# Patient Record
Sex: MALE | Race: ASIAN
[De-identification: names, ages, dates, MRNs, and addresses within clinical notes are randomized per-mention and may not be internally consistent; named-entity substitution may affect disease eponyms.]

---

## 2023-07-09 ENCOUNTER — HOSPITAL ENCOUNTER (EMERGENCY)
Dept: HOSPITAL 76 - ED | Age: 68
Discharge: HOME | End: 2023-07-09
Payer: MEDICARE

## 2023-07-09 VITALS — SYSTOLIC BLOOD PRESSURE: 129 MMHG | DIASTOLIC BLOOD PRESSURE: 70 MMHG

## 2023-07-09 DIAGNOSIS — H10.9: Primary | ICD-10-CM

## 2023-07-09 PROCEDURE — 99282 EMERGENCY DEPT VISIT SF MDM: CPT

## 2023-07-09 PROCEDURE — 99283 EMERGENCY DEPT VISIT LOW MDM: CPT

## 2023-07-09 NOTE — ED PHYSICIAN DOCUMENTATION
PD HPI OPHTHO





- Stated complaint


Stated Complaint: RT EYE PX





- Chief complaint


Chief Complaint: Heent





- History obtained from


History obtained from: Patient (Without specific inciting cause he developed 

foreign body sensation to the right eye starting yesterday that was progressive.

 Vision is unaffected.  He does not wear contacts.)





PD PAST MEDICAL HISTORY





- Present Medications


Home Medications: 


                                Ambulatory Orders











 Medication  Instructions  Recorded  Confirmed


 


Erythromycin Base [Erythromycin 1 appful OP 5XD 7 Days #1 gm 07/09/23 





Ophthalmic Ointment]   














- Allergies


Allergies/Adverse Reactions: 


                                    Allergies











Allergy/AdvReac Type Severity Reaction Status Date / Time


 


No Known Drug Allergies Allergy   Verified 07/09/23 16:17














PD ED PE NORMAL





- Vitals


Vital signs reviewed: Yes





- General


General: Alert and oriented X 3, No acute distress





- HEENT


HEENT: PERRL, EOMI, Other (Lateral conjunctivitis of the right eye.  No 

photophobia.  No contralateral photophobia.  Viral-Pen is 15 on the right.  

Fluorescein negative on the right.)





- Neuro


Neuro: Alert and oriented X 3, Normal speech





Results





- Vitals


Vitals: 





                               Vital Signs - 24 hr











  07/09/23





  16:17


 


Temperature 36.6 C


 


Heart Rate 56 L


 


Respiratory 16





Rate 


 


Blood Pressure 129/70


 


O2 Saturation 97














PD Medical Decision Making





- ED course


ED course: 





He has eye pain and conjunctivitis on the right.  No foreign body identified.  

The fornices were explored without foreign body either.  No fluorescein uptake. 

Iritis considered but he has no photophobia.  Eyes were irrigated but we will 

treat for conjunctivitis.





Departure





- Departure


Disposition: 01 Home, Self Care


Clinical Impression: 


 Conjunctivitis





Condition: Good


Record reviewed to determine appropriate education?: Yes


Instructions:  ED Conjunctivitis Nonspecific


Prescriptions: 


Erythromycin Base [Erythromycin Ophthalmic Ointment] 1 appful OP 5XD 7 Days #1 

gm


Comments: 


If not better in the next couple of days follow-up with a ophthalmologist in 

[place].  Return for new or worsening symptoms.